# Patient Record
Sex: FEMALE | Race: WHITE
[De-identification: names, ages, dates, MRNs, and addresses within clinical notes are randomized per-mention and may not be internally consistent; named-entity substitution may affect disease eponyms.]

---

## 2017-05-12 ENCOUNTER — HOSPITAL ENCOUNTER (EMERGENCY)
Dept: HOSPITAL 17 - NEPC | Age: 22
Discharge: HOME | End: 2017-05-12
Payer: SELF-PAY

## 2017-05-12 VITALS
SYSTOLIC BLOOD PRESSURE: 108 MMHG | DIASTOLIC BLOOD PRESSURE: 61 MMHG | OXYGEN SATURATION: 97 % | TEMPERATURE: 98 F | HEART RATE: 122 BPM | RESPIRATION RATE: 16 BRPM

## 2017-05-12 VITALS
OXYGEN SATURATION: 99 % | DIASTOLIC BLOOD PRESSURE: 85 MMHG | HEART RATE: 79 BPM | SYSTOLIC BLOOD PRESSURE: 157 MMHG | RESPIRATION RATE: 16 BRPM

## 2017-05-12 DIAGNOSIS — F17.210: ICD-10-CM

## 2017-05-12 DIAGNOSIS — I49.8: ICD-10-CM

## 2017-05-12 DIAGNOSIS — F11.19: Primary | ICD-10-CM

## 2017-05-12 LAB
ALP SERPL-CCNC: 122 U/L (ref 45–117)
ALT SERPL-CCNC: 19 U/L (ref 10–53)
ANION GAP SERPL CALC-SCNC: 11 MEQ/L (ref 5–15)
APAP SERPL-MCNC: (no result) MCG/ML (ref 10–30)
AST SERPL-CCNC: 25 U/L (ref 15–37)
BASOPHILS # BLD AUTO: 0 TH/MM3 (ref 0–0.2)
BASOPHILS NFR BLD: 0.3 % (ref 0–2)
BILIRUB SERPL-MCNC: 0.5 MG/DL (ref 0.2–1)
BUN SERPL-MCNC: 12 MG/DL (ref 7–18)
CHLORIDE SERPL-SCNC: 101 MEQ/L (ref 98–107)
CK SERPL-CCNC: 153 U/L (ref 26–192)
EOSINOPHIL # BLD: 0 TH/MM3 (ref 0–0.4)
EOSINOPHIL NFR BLD: 0.9 % (ref 0–4)
ERYTHROCYTE [DISTWIDTH] IN BLOOD BY AUTOMATED COUNT: 13 % (ref 11.6–17.2)
GFR SERPLBLD BASED ON 1.73 SQ M-ARVRAT: 69 ML/MIN (ref 89–?)
HCO3 BLD-SCNC: 24.7 MEQ/L (ref 21–32)
HCT VFR BLD CALC: 43.7 % (ref 35–46)
HEMO FLAGS: (no result)
LYMPHOCYTES # BLD AUTO: 1.9 TH/MM3 (ref 1–4.8)
LYMPHOCYTES NFR BLD AUTO: 34.1 % (ref 9–44)
MCH RBC QN AUTO: 27.7 PG (ref 27–34)
MCHC RBC AUTO-ENTMCNC: 33.6 % (ref 32–36)
MCV RBC AUTO: 82.4 FL (ref 80–100)
MONOCYTES NFR BLD: 6.5 % (ref 0–8)
NEUTROPHILS # BLD AUTO: 3.2 TH/MM3 (ref 1.8–7.7)
NEUTROPHILS NFR BLD AUTO: 58.2 % (ref 16–70)
PLATELET # BLD: 200 TH/MM3 (ref 150–450)
POTASSIUM SERPL-SCNC: 3.8 MEQ/L (ref 3.5–5.1)
RBC # BLD AUTO: 5.31 MIL/MM3 (ref 4–5.3)
SODIUM SERPL-SCNC: 137 MEQ/L (ref 136–145)
WBC # BLD AUTO: 5.5 TH/MM3 (ref 4–11)

## 2017-05-12 PROCEDURE — 71010: CPT

## 2017-05-12 PROCEDURE — 80307 DRUG TEST PRSMV CHEM ANLYZR: CPT

## 2017-05-12 PROCEDURE — 84703 CHORIONIC GONADOTROPIN ASSAY: CPT

## 2017-05-12 PROCEDURE — 84484 ASSAY OF TROPONIN QUANT: CPT

## 2017-05-12 PROCEDURE — 82550 ASSAY OF CK (CPK): CPT

## 2017-05-12 PROCEDURE — 85025 COMPLETE CBC W/AUTO DIFF WBC: CPT

## 2017-05-12 PROCEDURE — 80053 COMPREHEN METABOLIC PANEL: CPT

## 2017-05-12 PROCEDURE — 93005 ELECTROCARDIOGRAM TRACING: CPT

## 2017-05-12 NOTE — PD
Physical Exam


Date Seen by Provider:  May 12, 2017


Time Seen by Provider:  20:40


Narrative


22 YOWF HERE FOR MEDICAL TX FOR SUBSTANCE ABUSE. PT NOT ACTING RIGHT. HERE WITH 

FATHER AND BOYFRIEND. NO SI OR HI. LMP? IUD





VS NOTED





AWAITING BED PLACEMENT





Data


Data


Last Documented VS





Vital Signs








  Date Time  Temp Pulse Resp B/P Pulse Ox O2 Delivery O2 Flow Rate FiO2


 


5/12/17 20:30 98.0 122 16 108/61 97 Room Air  











[facility]


Medical Record Reviewed:  No


Supervised Visit with SABA:  Nakul Rodriguez May 12, 2017 20:43

## 2017-05-12 NOTE — RADRPT
EXAM DATE/TIME:  05/12/2017 21:36 

 

[facility] COMPARISON:     

No previous studies available for comparison.

 

                     

INDICATIONS :     

Shortness of breath and chest pain.

                     

 

MEDICAL HISTORY :     

None.          

 

SURGICAL HISTORY :     

None.   

 

ENCOUNTER:     

Initial                                        

 

ACUITY:     

2 days      

 

PAIN SCORE:     

3/10

 

LOCATION:      

chest 

 

FINDINGS:     

A single view of the chest demonstrates the lungs to be symmetrically aerated without evidence of mas
s, infiltrate or effusion.  The cardiomediastinal contours are unremarkable.  Osseous structures are 
intact.

 

CONCLUSION:     No acute disease.  

 

 

 

 Kade Sandoval MD on May 12, 2017 at 21:49           

Board Certified Radiologist.

 This report was verified electronically.

## 2017-05-12 NOTE — PD
HPI


Chief Complaint:  Medical Clearance


Time Seen by Provider:  20:45


Travel History


International Travel<30 days:  No


Contact w/Intl Traveler<30days:  No


Traveled to known affect area:  No





History of Present Illness


HPI


22-year-old female was brought in by family members for lethargy.  Patient's 

father states the patient has been drowsy today.  Patient's brother states the 

patient has been abusing drugs.  Patient states that she has been shooting up 

opiates daily including this afternoon.  Patient also states that she smoked 

marijuana.  Patient denies any alcohol consumption.  Patient denies any 

headache.  Patient denies any chest pain or shortness of breath.  Patient 

states that she has intermittent abdominal cramping for the past week.  Patient 

denies any dysuria or frequency.  Patient denies any vaginal discharge or 

bleeding.  Patient denies any fever chills.  Patient denies any back pain.  

Patient denies any focal weakness or numbness of the extremity.





PFSH


Past Medical History


Diminished Hearing:  No


Medical other:  Yes (IV drug use)


Immunizations Current:  No


Influenza Vaccination:  No


Pregnant?:  Not Pregnant


LMP:  UNKNOWN OF LAST PERIOD. MIRENA


:  1


Para:  0





Past Surgical History


 Section:  Yes





Social History


Alcohol Use:  No


Tobacco Use:  Yes (1/2 - 1 ppd)


Substance Use:  Yes (marijaunia, and IV drug abuse)





Allergies-Medications


(Allergen,Severity, Reaction):  


Coded Allergies:  


     No Known Allergies (Verified , 17)


Reported Meds & Prescriptions





Reported Meds & Active Scripts


Active


Reported


[birth control ]   1 Insert SQ MONTHLY








Review of Systems


General / Constitutional:  No: Fever


Eyes:  No: Visual changes


HENT:  No: Headaches


Cardiovascular:  No: Chest Pain or Discomfort


Respiratory:  No: Shortness of Breath


Gastrointestinal:  Positive: Abdominal Pain


Genitourinary:  No: Dysuria


Musculoskeletal:  No: Pain


Skin:  No Rash


Neurologic:  No: Weakness


Psychiatric:  No: Depression


Endocrine:  No: Polydipsia


Hematologic/Lymphatic:  No: Easy Bruising





Physical Exam


Narrative


GENERAL: Well-nourished, well-developed patient.


SKIN: Focused skin assessment warm/dry.


HEAD: Normocephalic.


EYES: No scleral icterus. No injection or drainage.  Pupils 5 mm dilated 

reactive.


NECK: Supple, trachea midline. No JVD or lymphadenopathy.  No meningismus


CARDIOVASCULAR: Regular rate and rhythm without murmurs, gallops, or rubs. 


RESPIRATORY: Breath sounds equal bilaterally. No accessory muscle use.


GASTROINTESTINAL: Abdomen soft, non-tender, nondistended. 


MUSCULOSKELETAL: No cyanosis, or edema. 


BACK: Nontender without obvious deformity. No CVA tenderness. 


Neurologic exam: Patient's drowsy.  Patient answers legends appropriately.  

Patient moves all extremities well.  No obvious focal neurological deficit.





Data


Data


Last Documented VS





Vital Signs








  Date Time  Temp Pulse Resp B/P Pulse Ox O2 Delivery O2 Flow Rate FiO2


 


17 21:46  79 16 157/85 99 Room Air  


 


17 20:30 98.0       








Orders





 Electrocardiogram (17 20:57)


Complete Blood Count With Diff (17 20:57)


Comprehensive Metabolic Panel (17 20:57)


Creatine Kinase (Cpk) (17 20:57)


Troponin I (17 20:57)


Prothrombin Time / Inr (Pt) (17 20:57)


Act Partial Throm Time (Ptt) (17 20:57)


Urinalysis - C+S If Indicated (17 20:57)


Chest, Single Ap (17 20:57)


Iv Access Insert/Monitor (17 20:57)


Ecg Monitoring (17 20:57)


Oximetry (17 20:57)


Ed Urine Pregnancytest Poc (17 20:57)


Drug Screen, Random Urine (17 20:57)


Alcohol (Ethanol) (17 20:57)


Salicylates (Aspirin) (17 20:57)


Tylenol (Acetaminophen) (17 20:57)


Sodium Chlor 0.9% 1000 Ml Inj (Ns 1000 M (17 21:00)





Labs








 Laboratory Tests








Test 17





 21:15


 


White Blood Count 5.5 TH/MM3


 


Red Blood Count 5.31 MIL/MM3


 


Hemoglobin 14.7 GM/DL


 


Hematocrit 43.7 %


 


Mean Corpuscular Volume 82.4 FL


 


Mean Corpuscular Hemoglobin 27.7 PG


 


Mean Corpuscular Hemoglobin 33.6 %





Concent 


 


Red Cell Distribution Width 13.0 %


 


Platelet Count 200 TH/MM3


 


Mean Platelet Volume 10.6 FL


 


Neutrophils (%) (Auto) 58.2 %


 


Lymphocytes (%) (Auto) 34.1 %


 


Monocytes (%) (Auto) 6.5 %


 


Eosinophils (%) (Auto) 0.9 %


 


Basophils (%) (Auto) 0.3 %


 


Neutrophils # (Auto) 3.2 TH/MM3


 


Lymphocytes # (Auto) 1.9 TH/MM3


 


Monocytes # (Auto) 0.4 TH/MM3


 


Eosinophils # (Auto) 0.0 TH/MM3


 


Basophils # (Auto) 0.0 TH/MM3


 


CBC Comment DIFF FINAL 


 


Differential Comment  


 


Sodium Level 137 MEQ/L


 


Potassium Level 3.8 MEQ/L


 


Chloride Level 101 MEQ/L


 


Carbon Dioxide Level 24.7 MEQ/L


 


Anion Gap 11 MEQ/L


 


Blood Urea Nitrogen 12 MG/DL


 


Creatinine 1.00 MG/DL


 


Estimat Glomerular Filtration 69 ML/MIN





Rate 


 


Random Glucose 96 MG/DL


 


Calcium Level 9.5 MG/DL


 


Total Bilirubin 0.5 MG/DL


 


Aspartate Amino Transf 25 U/L





(AST/SGOT) 


 


Alanine Aminotransferase 19 U/L





(ALT/SGPT) 


 


Alkaline Phosphatase 122 U/L


 


Total Creatine Kinase 153 U/L


 


Troponin I LESS THAN 0.02





 NG/ML


 


Total Protein 8.6 GM/DL


 


Albumin 4.1 GM/DL


 


Acetaminophen Level LESS THAN 2.0





 MCG/ML


 


Ethyl Alcohol Level LESS THAN 3





 MG/DL














MDM


Medical Decision Making


Medical Screen Exam Complete:  Yes


Emergency Medical Condition:  Yes


Differential Diagnosis


Differential diagnosis including substance-induced mood disorder, dehydration, 

electrolyte imbalance


Narrative Course


22-year-old female with history of substance abuse including opiates and 

marijuana.  Patient was brought in by family members for drowsiness.  Normal 

saline solution 1 25 cc an hour.  Patient refused IV and ripped her IV out.  

Patient tried several times and unable to give us a urine sample.  Patient 

refused further treatment and wants relief now.  Patient is awake and alert 

oriented 3.  Patient is steady on her feet.  Family members with her.





Diagnosis





 Primary Impression:  


 Substance induced mood disorder


Patient Instructions:  General Instructions





***Additional Instructions:


Advised [facility].  Follow-up with local physician.  Return as 

needed.


***Med/Other Pt SpecificInfo:  No Meds Exist/No RX given


Disposition:  01 DISCHARGE HOME


Condition:  Stable








Ry Brandon MD May 12, 2017 21:07

## 2017-05-13 NOTE — EKG
Date Performed: 05/12/2017       Time Performed: 21:09:27

 

PTAGE:      22 years

 

EKG:      Sinus rhythm 

 

 WITH SINUS ARRHYTHMIA NONSPECIFIC SEPTAL T WAVE CHANGES 

 

NO PREVIOUS TRACING            

 

DOCTOR:   Marianna Gonsales  Interpretating Date/Time  05/13/2017 13:34:50

## 2017-10-01 ENCOUNTER — HOSPITAL ENCOUNTER (EMERGENCY)
Dept: HOSPITAL 17 - PHEFT | Age: 22
Discharge: HOME | End: 2017-10-01
Payer: SELF-PAY

## 2017-10-01 VITALS
OXYGEN SATURATION: 98 % | DIASTOLIC BLOOD PRESSURE: 84 MMHG | HEART RATE: 89 BPM | RESPIRATION RATE: 16 BRPM | TEMPERATURE: 99.1 F | SYSTOLIC BLOOD PRESSURE: 139 MMHG

## 2017-10-01 VITALS — HEIGHT: 64 IN | BODY MASS INDEX: 19.16 KG/M2 | WEIGHT: 112.22 LBS

## 2017-10-01 DIAGNOSIS — F17.210: ICD-10-CM

## 2017-10-01 DIAGNOSIS — L02.413: Primary | ICD-10-CM

## 2017-10-01 DIAGNOSIS — N76.4: ICD-10-CM

## 2017-10-01 PROCEDURE — 56405 I&D VULVA/PERINEAL ABSCESS: CPT

## 2017-10-01 NOTE — PD
HPI


Chief Complaint:  Skin Problem


Time Seen by Provider:  15:01


Travel History


International Travel<30 days:  No


Contact w/Intl Traveler<30days:  No


Traveled to known affect area:  No





History of Present Illness


HPI


23 yo female here with c/o of RUE abscess x 3 days, she denies fever or chills. 

she reports hx of IVDA, but denies injecting into this arm.  She also reports 

an abscess in the right labia 3-5 days.  She reports previous history of 

abscesses with similar symptoms.  Denies any possibility of retained foreign 

body.  Symptom severity is moderate.  Duration 3 days.  No alleviating factors.

  She denies fever, chills, chest pain, shortness breath, abdominal pain, 

vaginal discharge.





PFSH


Past Medical History


Medical History:  Denies Significant Hx


Diminished Hearing:  No


Immunizations Current:  No


Tetanus Vaccination:  > 5 Years


Influenza Vaccination:  No


Pregnant?:  Not Pregnant


:  1


Para:  0





Past Surgical History


 Section:  Yes


Other Surgery:  Yes (lt forearm)





Social History


Alcohol Use:  No


Tobacco Use:  Yes (1/2 - 1 ppd)


Substance Use:  Yes (marijaunia, and IV drug abuse)





Allergies-Medications


(Allergen,Severity, Reaction):  


Coded Allergies:  


     No Known Allergies (Verified , 10/1/17)


Reported Meds & Prescriptions





Reported Meds & Active Scripts


Active


Bactrim DS (Sulfamethoxazole-Trimethoprim) 800-160 Mg Tab 1 Tab PO BID


Keflex (Cephalexin) 500 Mg Cap 500 Mg PO Q6H 10 Days








Review of Systems


Except as stated in HPI:  all other systems reviewed are Neg





Physical Exam


Narrative


GENERAL: Well-nourished, well-developed patient.


SKIN: Focused skin assessment warm/dry.


HEAD: Normocephalic.


EYES: No scleral icterus. No injection or drainage. 


NECK: Supple, trachea midline. No JVD or lymphadenopathy.


CARDIOVASCULAR: Regular rate and rhythm without murmurs, gallops, or rubs. 


RESPIRATORY: Breath sounds equal bilaterally. No accessory muscle use.


GASTROINTESTINAL: Abdomen soft, non-tender, nondistended. 


: External genitalia without lesions.  Large fluctuant abscess to the right 

labia majora with surrounding erythema.


MUSCULOSKELETAL: No cyanosis, or edema.  Right upper extremity: 3 x 4 area of 

erythema with a central shallow opening with purulent drainage to the anterior 

distal forearm near the wrist.  The area is indurated without fluctuance.  

Small amount of purulent drainage from the wound.  No lymphangitis.  No joint 

swelling.





Data


Data


Last Documented VS





Vital Signs








  Date Time  Temp Pulse Resp B/P (MAP) Pulse Ox O2 Delivery O2 Flow Rate FiO2


 


10/1/17 14:09 99.1 89 16 139/84 (102) 98   











MDM


Medical Decision Making


Medical Screen Exam Complete:  Yes


Emergency Medical Condition:  Yes


Differential Diagnosis


Abscess, cellulitis, Bartholin's cyst abscess


Narrative Course


22-year-old female presents emergency department for evaluation of abscess to 

the right upper extremity and right labia 3-5 days.  Patient denies fever or 

chills.  She reports similar symptoms in the past with abscesses.  She does 

have a history of IV drug abuse.  She denies injecting into the right arm.  On 

exam patient has an open draining abscess to the right upper extremity.  She 

has a large fluctuant abscess to the right labia.  The patient is nontoxic 

appearing.  Her vital signs are stable.  I&D will be performed by Elisabeth RODRIGUEZ 

please see her note for procedure details and patient will be discharged home 

on antibiotics.





Patient is referred to the [facility] for follow-up.  Return precautions 

discussed.  Patient verbalizes understanding and agrees to plan





Diagnosis





 Primary Impression:  


 Abscess


Referrals:  


[facility]





***Additional Instructions:  


Take antibiotics as prescribed.


Take over-the-counter Tylenol or Motrin as needed for pain.


Cleansed the area daily with soap and water and cover with a clean dry dressing.


Follow-up in 2 days for wound recheck.


Return to the emergency department if he developed new or worsening symptoms.


Scripts


Sulfamethoxazole-Trimethoprim (Bactrim DS) 800-160 Mg Tab


1 TAB PO BID for Infection, #20 TAB 0 Refills


   Prov: Lashay Torres         10/1/17 


Cephalexin (Keflex) 500 Mg Cap


500 MG PO Q6H for Infection for 10 Days, #40 CAP 0 Refills


   Prov: Lashay Torres         10/1/17


Disposition:  01 DISCHARGE HOME


Condition:  Stable











Lashay Torres Oct 1, 2017 15:01

## 2017-10-01 NOTE — PD
Physical Exam


Date Seen by Provider:  Oct 1, 2017


Time Seen by Provider:  15:45


Narrative


Incision and Drainage


LOCATION: right vaginal labia majora


3 cm mass fluctuance in the center with surrounding erythema. Pt has tenderness 

to palpation with a 0.25cm central ecchymotic region. 


REPAIR: The area of the abscess was prepped with Betadine..  The central 

portion of the abscess was superficially infiltrated with 3ccs lidocaine 2% 

with epinephrine.  Using a #11 blade, a small 1 cm incision was made. The site 

expressed bloody exudate with minimal pressure. Site remained fluctuant and 

tender. There was no indication that Bartholin glands were involved via 

palpation.  The wound was closed using left un packed and open. The patient was 

advised to keep the dressing clean and dry and that the wound will continue to 

express exudate. Patient tolerated the procedure well.





Data


Data


Last Documented VS





Vital Signs








  Date Time  Temp Pulse Resp B/P (MAP) Pulse Ox O2 Delivery O2 Flow Rate FiO2


 


10/1/17 14:09 99.1 89 16 139/84 (102) 98   











MDM


Supervised Visit with SABA:  Yes


Diagnosis





 Primary Impression:  


 Abscess


Referrals:  


[facility] [address]





***Additional Instruction:  


Take antibiotics as prescribed.


Take over-the-counter Tylenol or Motrin as needed for pain.


Cleansed the area daily with soap and water and cover with a clean dry dressing.


Follow-up in 2 days for wound recheck.


Return to the emergency department if he developed new or worsening symptoms.


Scripts


Sulfamethoxazole-Trimethoprim (Bactrim DS) 800-160 Mg Tab


1 TAB PO BID for Infection, #20 TAB 0 Refills


   Prov: Lashay TorresP         10/1/17 


Cephalexin (Keflex) 500 Mg Cap


500 MG PO Q6H for Infection for 10 Days, #40 CAP 0 Refills


   Prov: Lashay TorresP         10/1/17


Disposition:  01 DISCHARGE HOME


Condition:  Stable











Elisabeth Irwin Oct 1, 2017 16:02

## 2018-06-27 ENCOUNTER — HOSPITAL ENCOUNTER (EMERGENCY)
Dept: HOSPITAL 17 - NEPE | Age: 23
Discharge: HOME | End: 2018-06-27
Payer: SELF-PAY

## 2018-06-27 VITALS
HEART RATE: 128 BPM | OXYGEN SATURATION: 99 % | TEMPERATURE: 99.2 F | SYSTOLIC BLOOD PRESSURE: 159 MMHG | DIASTOLIC BLOOD PRESSURE: 92 MMHG | RESPIRATION RATE: 18 BRPM

## 2018-06-27 VITALS
OXYGEN SATURATION: 97 % | SYSTOLIC BLOOD PRESSURE: 148 MMHG | DIASTOLIC BLOOD PRESSURE: 86 MMHG | RESPIRATION RATE: 16 BRPM | HEART RATE: 98 BPM

## 2018-06-27 DIAGNOSIS — R10.9: ICD-10-CM

## 2018-06-27 DIAGNOSIS — F19.10: ICD-10-CM

## 2018-06-27 DIAGNOSIS — A59.9: Primary | ICD-10-CM

## 2018-06-27 DIAGNOSIS — M79.89: ICD-10-CM

## 2018-06-27 DIAGNOSIS — F12.90: ICD-10-CM

## 2018-06-27 DIAGNOSIS — B96.89: ICD-10-CM

## 2018-06-27 DIAGNOSIS — F17.210: ICD-10-CM

## 2018-06-27 DIAGNOSIS — R00.0: ICD-10-CM

## 2018-06-27 DIAGNOSIS — N76.0: ICD-10-CM

## 2018-06-27 DIAGNOSIS — Z88.2: ICD-10-CM

## 2018-06-27 DIAGNOSIS — R60.9: ICD-10-CM

## 2018-06-27 LAB
ALBUMIN SERPL-MCNC: 3.8 GM/DL (ref 3.4–5)
ALP SERPL-CCNC: 72 U/L (ref 45–117)
ALT SERPL-CCNC: 163 U/L (ref 10–53)
AMORPHOUS SEDIMENT, URINE: (no result)
AST SERPL-CCNC: 133 U/L (ref 15–37)
BACTERIA #/AREA URNS HPF: (no result) /HPF
BASOPHILS # BLD AUTO: 0 TH/MM3 (ref 0–0.2)
BASOPHILS NFR BLD: 0.4 % (ref 0–2)
BILIRUB SERPL-MCNC: 0.5 MG/DL (ref 0.2–1)
BUN SERPL-MCNC: 9 MG/DL (ref 7–18)
CALCIUM SERPL-MCNC: 9 MG/DL (ref 8.5–10.1)
CHLORIDE SERPL-SCNC: 102 MEQ/L (ref 98–107)
CREAT SERPL-MCNC: 0.9 MG/DL (ref 0.5–1)
EOSINOPHIL # BLD: 0.1 TH/MM3 (ref 0–0.4)
EOSINOPHIL NFR BLD: 0.9 % (ref 0–4)
ERYTHROCYTE [DISTWIDTH] IN BLOOD BY AUTOMATED COUNT: 15.3 % (ref 11.6–17.2)
GFR SERPLBLD BASED ON 1.73 SQ M-ARVRAT: 77 ML/MIN (ref 89–?)
GLUCOSE SERPL-MCNC: 128 MG/DL (ref 74–106)
GLUCOSE UR STRIP-MCNC: (no result) MG/DL
HCO3 BLD-SCNC: 24.7 MEQ/L (ref 21–32)
HCT VFR BLD CALC: 37.6 % (ref 35–46)
HGB BLD-MCNC: 12.8 GM/DL (ref 11.6–15.3)
HGB UR QL STRIP: (no result)
KETONES UR STRIP-MCNC: (no result) MG/DL
LYMPHOCYTES # BLD AUTO: 1.8 TH/MM3 (ref 1–4.8)
LYMPHOCYTES NFR BLD AUTO: 23.8 % (ref 9–44)
LYMPHOCYTES: 23 % (ref 9–44)
MCH RBC QN AUTO: 29.3 PG (ref 27–34)
MCHC RBC AUTO-ENTMCNC: 34.1 % (ref 32–36)
MCV RBC AUTO: 85.8 FL (ref 80–100)
MONOCYTE #: 0.6 TH/MM3 (ref 0–0.9)
MONOCYTES NFR BLD: 8.2 % (ref 0–8)
MONOCYTES: 10 % (ref 0–8)
MUCOUS THREADS #/AREA URNS LPF: (no result) /LPF
NEUTROPHILS # BLD AUTO: 5.1 TH/MM3 (ref 1.8–7.7)
NEUTROPHILS NFR BLD AUTO: 66.7 % (ref 16–70)
NEUTS BAND # BLD MANUAL: 5.2 TH/MM3 (ref 1.8–7.7)
NEUTS BAND NFR BLD: 4 % (ref 0–6)
NEUTS SEG NFR BLD MANUAL: 63 % (ref 16–70)
NITRITE UR QL STRIP: (no result)
NUCLEATED RED BLOOD CELL: 1 (ref 0–0)
PLATELET # BLD: 194 TH/MM3 (ref 150–450)
PMV BLD AUTO: 10.1 FL (ref 7–11)
PROT SERPL-MCNC: 7.5 GM/DL (ref 6.4–8.2)
RBC # BLD AUTO: 4.39 MIL/MM3 (ref 4–5.3)
SODIUM SERPL-SCNC: 136 MEQ/L (ref 136–145)
SP GR UR STRIP: 1.03 (ref 1–1.03)
SQUAMOUS #/AREA URNS HPF: 1 /HPF (ref 0–5)
URINE LEUKOCYTE ESTERASE: (no result)
WBC # BLD AUTO: 7.7 TH/MM3 (ref 4–11)
WBC NRBC COR # BLD: 1 /100 WBC (ref 0–0)

## 2018-06-27 PROCEDURE — 96372 THER/PROPH/DIAG INJ SC/IM: CPT

## 2018-06-27 PROCEDURE — 81001 URINALYSIS AUTO W/SCOPE: CPT

## 2018-06-27 PROCEDURE — 87210 SMEAR WET MOUNT SALINE/INK: CPT

## 2018-06-27 PROCEDURE — 85027 COMPLETE CBC AUTOMATED: CPT

## 2018-06-27 PROCEDURE — 93971 EXTREMITY STUDY: CPT

## 2018-06-27 PROCEDURE — 99284 EMERGENCY DEPT VISIT MOD MDM: CPT

## 2018-06-27 PROCEDURE — 85007 BL SMEAR W/DIFF WBC COUNT: CPT

## 2018-06-27 PROCEDURE — 87591 N.GONORRHOEAE DNA AMP PROB: CPT

## 2018-06-27 PROCEDURE — 80053 COMPREHEN METABOLIC PANEL: CPT

## 2018-06-27 PROCEDURE — 87491 CHLMYD TRACH DNA AMP PROBE: CPT

## 2018-06-27 PROCEDURE — 84703 CHORIONIC GONADOTROPIN ASSAY: CPT

## 2018-06-27 NOTE — PD
HPI


Chief Complaint:  Abdominal Pain


Time Seen by Provider:  07:06


Travel History


International Travel<30 days:  No


Contact w/Intl Traveler<30days:  No


Traveled to known affect area:  No





History of Present Illness


HPI


23-year-old female arrives with complaint of swelling of the left lower 

extremity as well as vaginal discharge with abdominal pain.  She reports 

abdominal pain feels like prior episodes of bacterial vaginosis.  She also 

reports she is an IUD present which has been present for 8 years.  No fever.  

No vomiting.  Duration of GI symptoms 2 weeks.  Duration of left lower 

extremity swelling is about 2 days.





[facility]


Past Medical History


Medical History:  Denies Significant Hx


Diminished Hearing:  No


Immunizations Current:  Yes


Pregnant?:  Not Pregnant


:  1


Para:  0





Past Surgical History


 Section:  Yes


Other Surgery:  Yes (lt forearm)





Social History


Alcohol Use:  No


Tobacco Use:  Yes (2 -  ppd)


Substance Use:  Yes (marijaunia, and IV drug abuse)





Allergies-Medications


(Allergen,Severity, Reaction):  


Coded Allergies:  


     sulfamethoxazole (Verified  Allergy, Severe, 18)


     trimethoprim (Verified  Allergy, Severe, 18)


Reported Meds & Prescriptions





Reported Meds & Active Scripts


Active


No Active Prescriptions or Reported Medications    








Review of Systems


Except as stated in HPI:  all other systems reviewed are Neg


General / Constitutional:  No: Fever





Physical Exam


Narrative


GENERAL: 23-year-old female pleasant well-nourished well-developed


Vital Signs








  Date Time  Temp Pulse Resp B/P (MAP) Pulse Ox O2 Delivery O2 Flow Rate FiO2


 


18 06:26 99.2 128 18 159/92 (114) 99   





SKIN: Warm and dry.


HEAD: Atraumatic. Normocephalic. 


EYES: Pupils equal and round. No scleral icterus. No injection or drainage. 


ENT: No nasal bleeding or discharge.  Mucous membranes pink and moist.


NECK: Trachea midline. No JVD. 


CARDIOVASCULAR: Tachycardia.  Regular rhythm.


GENITOURINARY: There is an IUD in place.  There is minimal discharge within the 

vault.  Minimal cervical tenderness present.


RESPIRATORY: No accessory muscle use. Clear to auscultation. Breath sounds 

equal bilaterally. 


GASTROINTESTINAL: Abdomen soft, non-tender, nondistended. Hepatic and splenic 

margins not palpable. 


MUSCULOSKELETAL: Extremities without clubbing, cyanosis.  There is minimal 

edema left lower extremity without tenderness.  No obvious deformities. 


NEUROLOGICAL: Awake and alert. No obvious cranial nerve deficits.  Motor 

grossly within normal limits. Five out of 5 muscle strength in the arms and 

legs.  Normal speech.


PSYCHIATRIC: Appropriate mood and affect; insight and judgment normal.





Data


Data


Last Documented VS





Vital Signs








  Date Time  Temp Pulse Resp B/P (MAP) Pulse Ox O2 Delivery O2 Flow Rate FiO2


 


18 06:26 99.2 128 18 159/92 (114) 99   








Orders





 Orders


Complete Blood Count With Diff (18 07:21)


Comprehensive Metabolic Panel (18 07:21)


Gc And Chlamydia Pcr (18 07:21)


Wet Prep Profile (18 07:21)


Urinalysis - C+S If Indicated (18 07:21)


Iv Access Insert/Monitor (18 07:21)


Ecg Monitoring (18 07:21)


Sodium Chlor 0.9% 1000 Ml Inj (Ns 1000 M (18 07:21)


Ed Urine Pregnancytest Poc (18 07:21)


Us Leg Venous Doppler (18 )


Sodium Chlor 0.9% 1000 Ml Inj (Ns 1000 M (18 07:30)


Azithromycin Powd Pack (Zithromax Powd P (18 09:30)


Ceftriaxone Inj (Rocephin Inj) (18 09:30)


Lidocaine 1% Inj (50 Ml) (Xylocaine 1% I (18 09:30)


Metronidazole (Flagyl) (18 09:30)





Labs





Laboratory Tests








Test


  18


07:28 18


07:35 18


08:35


 


White Blood Count 7.7 TH/MM3   


 


Red Blood Count 4.39 MIL/MM3   


 


Hemoglobin 12.8 GM/DL   


 


Hematocrit 37.6 %   


 


Mean Corpuscular Volume 85.8 FL   


 


Mean Corpuscular Hemoglobin 29.3 PG   


 


Mean Corpuscular Hemoglobin


Concent 34.1 % 


  


  


 


 


Red Cell Distribution Width 15.3 %   


 


Platelet Count 194 TH/MM3   


 


Mean Platelet Volume 10.1 FL   


 


Neutrophils (%) (Auto) 66.7 %   


 


Lymphocytes (%) (Auto) 23.8 %   


 


Monocytes (%) (Auto) 8.2 %   


 


Eosinophils (%) (Auto) 0.9 %   


 


Basophils (%) (Auto) 0.4 %   


 


Neutrophils # (Auto) 5.1 TH/MM3   


 


Lymphocytes # (Auto) 1.8 TH/MM3   


 


Monocytes # (Auto) 0.6 TH/MM3   


 


Eosinophils # (Auto) 0.1 TH/MM3   


 


Basophils # (Auto) 0.0 TH/MM3   


 


CBC Comment AUTO DIFF   


 


Differential Total Cells


Counted 100 


  


  


 


 


Neutrophils % (Manual) 63 %   


 


Band Neutrophils % 4 %   


 


Lymphocytes % 23 %   


 


Monocytes % 10 %   


 


Neutrophils # (Manual) 5.2 TH/MM3   


 


Nucleated Red Blood Cells 1 /100 WBC   


 


Differential Comment


  FINAL DIFF


MANUAL 


  


 


 


Platelet Estimate NORMAL   


 


Platelet Morphology Comment NORMAL   


 


Red Cell Morphology Comment NORMAL   


 


Blood Urea Nitrogen 9 MG/DL   


 


Creatinine 0.90 MG/DL   


 


Random Glucose 128 MG/DL   


 


Total Protein 7.5 GM/DL   


 


Albumin 3.8 GM/DL   


 


Calcium Level 9.0 MG/DL   


 


Alkaline Phosphatase 72 U/L   


 


Aspartate Amino Transf


(AST/SGOT) 133 U/L 


  


  


 


 


Alanine Aminotransferase


(ALT/SGPT) 163 U/L 


  


  


 


 


Total Bilirubin 0.5 MG/DL   


 


Sodium Level 136 MEQ/L   


 


Potassium Level 3.7 MEQ/L   


 


Chloride Level 102 MEQ/L   


 


Carbon Dioxide Level 24.7 MEQ/L   


 


Anion Gap 9 MEQ/L   


 


Estimat Glomerular Filtration


Rate 77 ML/MIN 


  


  


 


 


Urine Color  Suzi  


 


Urine Turbidity  CLEAR  


 


Urine pH  5.0  


 


Urine Specific Gravity  1.030  


 


Urine Protein  30 mg/dL  


 


Urine Glucose (UA)  NEG mg/dL  


 


Urine Ketones  TRACE mg/dL  


 


Urine Occult Blood  NEG  


 


Urine Nitrite  NEG  


 


Urine Bilirubin  NEG  


 


Urine Urobilinogen


  


  4.0 OR GREATER


mg/dL 


 


 


Urine Leukocyte Esterase  TRACE  


 


Urine RBC  1 /hpf  


 


Urine WBC  5 /hpf  


 


Urine Squamous Epithelial


Cells 


  1 /hpf 


  


 


 


Urine Amorphous Sediment  RARE  


 


Urine Bacteria  FEW /hpf  


 


Urine Mucus  MANY /lpf  


 


Microscopic Urinalysis Comment


  


  CULT NOT


INDICATED 


 


 


Clue Cells (Wet Prep)   PRESENT 


 


Vaginal Trichomonas (Wet Prep)   PRESENT 


 


Vaginal Yeast (Wet Prep)   NONE SEEN 











MDM


Medical Decision Making


Medical Screen Exam Complete:  Yes


Emergency Medical Condition:  Yes


Medical Record Reviewed:  Yes


Differential Diagnosis


IUP, UTI, ectopic pregnancy, ov torsion, appendicitis, TOA, cervicitis, BV, 

Trichomoniasis, ov cyst, hernia, mittelschmerz, pain from menstruation, DVT


Narrative Course


CBC & BMP Diagram


18 07:28








Total Protein 7.5, Albumin 3.8, Calcium Level 9.0, Alkaline Phosphatase 72, 

Aspartate Amino Transf (AST/SGOT) 133 H, Alanine Aminotransferase (ALT/SGPT) 

163 H, Total Bilirubin 0.5





UA shows possible UTI


Wet prep shows trichomoniasis and bacterial vaginosis


Empiric coverage for GC chlamydia.  Flagyl 2 g given here.  


Flagyl prescription.


Left lower extremity edema could be due to lymphadenopathy in the left side.





Last Impressions








Lower Extremity Ultrasound 18 0000 Signed





Impressions: 





 CONCLUSION: 





 1.  Negative for deep venous thrombosis. Moderate edema.





  





 











Diagnosis





 Primary Impression:  


 Encounter for IUD removal


 Additional Impressions:  


 Trichomoniasis


 Bacterial vaginosis


 Edema leg


Referrals:  


[facility] Dept.


2 days


***Med/Other Pt SpecificInfo:  Prescription(s) given


Scripts


Metronidazole (Flagyl) 500 Mg Tab


500 MG PO BID for Infection for 7 Days, #14 TAB 0 Refills


   Prov: Epifanio Gregorio MD         18 


Ciprofloxacin (Cipro) 500 Mg Tab


500 MG PO BID for Infection for 5 Days, #10 TAB 0 Refills


   Prov: Epifanio Gregorio MD         18


Disposition:  01 DISCHARGE HOME


Condition:  Stable











Epifanio Gregorio MD 2018 09:31

## 2018-06-27 NOTE — RADRPT
EXAM DATE:  2018 8:27 AM EDT

AGE/SEX:        23 years / Female



INDICATIONS:  Left leg swelling. 



CLINICAL DATA:  This is the patient's initial encounter. Patient reports that signs and symptoms have
 been present for 2 days and indicates a pain score of 0/10. 

                                                                          

MEDICAL/SURGICAL HISTORY:       Pregnancy.  section.



COMPARISON:      No prior exams available for comparison. 





TECHNIQUE:  Venous ultrasound of both lower extremities was performed from the inguinal ligament to t
he proximal calf.  Real-time, color Doppler and spectral tracing, compression and augmentation techni
ques were used.  



FINDINGS:  Normal compression of the deep venous system from the inguinal region to the proximal calf
. No echogenic clot is seen. Normal response of the venous system to augmentation and respiration.



CONCLUSION: 

1.  Negative for deep venous thrombosis. Moderate edema.



Electronically signed by: Fidel Montes De Oca MD  2018 8:56 AM EDT